# Patient Record
Sex: FEMALE | Race: WHITE | NOT HISPANIC OR LATINO
[De-identification: names, ages, dates, MRNs, and addresses within clinical notes are randomized per-mention and may not be internally consistent; named-entity substitution may affect disease eponyms.]

---

## 2019-01-14 ENCOUNTER — TRANSCRIPTION ENCOUNTER (OUTPATIENT)
Age: 79
End: 2019-01-14

## 2019-01-15 ENCOUNTER — OUTPATIENT (OUTPATIENT)
Dept: OUTPATIENT SERVICES | Facility: HOSPITAL | Age: 79
LOS: 1 days | End: 2019-01-15
Payer: MEDICARE

## 2019-01-15 VITALS
SYSTOLIC BLOOD PRESSURE: 109 MMHG | DIASTOLIC BLOOD PRESSURE: 63 MMHG | HEART RATE: 83 BPM | RESPIRATION RATE: 16 BRPM | OXYGEN SATURATION: 94 %

## 2019-01-15 VITALS
TEMPERATURE: 99 F | OXYGEN SATURATION: 96 % | HEART RATE: 80 BPM | SYSTOLIC BLOOD PRESSURE: 132 MMHG | WEIGHT: 177.69 LBS | RESPIRATION RATE: 20 BRPM | HEIGHT: 63 IN | DIASTOLIC BLOOD PRESSURE: 65 MMHG

## 2019-01-15 DIAGNOSIS — Z98.49 CATARACT EXTRACTION STATUS, UNSPECIFIED EYE: Chronic | ICD-10-CM

## 2019-01-15 DIAGNOSIS — N60.09 SOLITARY CYST OF UNSPECIFIED BREAST: Chronic | ICD-10-CM

## 2019-01-15 DIAGNOSIS — Z90.49 ACQUIRED ABSENCE OF OTHER SPECIFIED PARTS OF DIGESTIVE TRACT: Chronic | ICD-10-CM

## 2019-01-15 DIAGNOSIS — H33.021 RETINAL DETACHMENT WITH MULTIPLE BREAKS, RIGHT EYE: ICD-10-CM

## 2019-01-15 PROCEDURE — C1889: CPT

## 2019-01-15 PROCEDURE — 67108 REPAIR DETACHED RETINA: CPT | Mod: RT

## 2019-01-15 PROCEDURE — 93010 ELECTROCARDIOGRAM REPORT: CPT

## 2019-01-15 PROCEDURE — 93005 ELECTROCARDIOGRAM TRACING: CPT

## 2019-01-15 NOTE — ASU PREOP CHECKLIST - HEIGHT IN INCHES
Anxiety    Headache    History of ITP    HTN (hypertension)    IBS (irritable bowel syndrome)    Labyrinthitis 3

## 2019-01-15 NOTE — ASU PATIENT PROFILE, ADULT - PMH
Chronic sinusitis    GERD (gastroesophageal reflux disease)    HTN (hypertension)    Hyperparathyroidism

## 2019-01-15 NOTE — ASU DISCHARGE PLAN (ADULT/PEDIATRIC). - PT EDUC
Jona gas bubble card, Eye shield instructions and eye kit given to patient/Implant card (specify)/other (specify)

## 2023-01-30 PROBLEM — K21.9 GASTRO-ESOPHAGEAL REFLUX DISEASE WITHOUT ESOPHAGITIS: Chronic | Status: ACTIVE | Noted: 2019-01-15

## 2023-01-30 PROBLEM — E21.3 HYPERPARATHYROIDISM, UNSPECIFIED: Chronic | Status: ACTIVE | Noted: 2019-01-15

## 2023-01-30 PROBLEM — J32.9 CHRONIC SINUSITIS, UNSPECIFIED: Chronic | Status: ACTIVE | Noted: 2019-01-15

## 2023-01-30 PROBLEM — I10 ESSENTIAL (PRIMARY) HYPERTENSION: Chronic | Status: ACTIVE | Noted: 2019-01-15

## 2023-02-20 ENCOUNTER — APPOINTMENT (OUTPATIENT)
Dept: PHYSICAL MEDICINE AND REHAB | Facility: CLINIC | Age: 83
End: 2023-02-20

## 2023-06-08 ENCOUNTER — RX ONLY (RX ONLY)
Age: 83
End: 2023-06-08

## 2023-07-03 ENCOUNTER — OFFICE (OUTPATIENT)
Facility: LOCATION | Age: 83
Setting detail: OPHTHALMOLOGY
End: 2023-07-03
Payer: MEDICARE

## 2023-07-03 DIAGNOSIS — Z13.5: ICD-10-CM

## 2023-07-03 DIAGNOSIS — H35.3131: ICD-10-CM

## 2023-07-03 DIAGNOSIS — H01.004: ICD-10-CM

## 2023-07-03 DIAGNOSIS — H01.001: ICD-10-CM

## 2023-07-03 DIAGNOSIS — H35.373: ICD-10-CM

## 2023-07-03 PROBLEM — H43.813 POSTERIOR VITREOUS DETACHMENT; BOTH EYES: Status: ACTIVE | Noted: 2023-07-03

## 2023-07-03 PROBLEM — H02.834 DERMATOCHALASIS; RIGHT UPPER LID, LEFT UPPER LID: Status: ACTIVE | Noted: 2023-07-03

## 2023-07-03 PROBLEM — H02.831 DERMATOCHALASIS; RIGHT UPPER LID, LEFT UPPER LID: Status: ACTIVE | Noted: 2023-07-03

## 2023-07-03 PROBLEM — D31.32 CHOROIDAL NEVUS, LEFT EYE: Status: ACTIVE | Noted: 2023-07-03

## 2023-07-03 PROCEDURE — 92014 COMPRE OPH EXAM EST PT 1/>: CPT | Performed by: OPHTHALMOLOGY

## 2023-07-03 PROCEDURE — 92250 FUNDUS PHOTOGRAPHY W/I&R: CPT | Performed by: OPHTHALMOLOGY

## 2023-07-03 ASSESSMENT — LID EXAM ASSESSMENTS
OD_BLEPHARITIS: 1+
OD_DERMATOCHALASIS: 1+
OS_DERMATOCHALASIS: 1+
OS_BLEPHARITIS: 1+

## 2023-07-03 ASSESSMENT — CONFRONTATIONAL VISUAL FIELD TEST (CVF)
OS_FINDINGS: FULL
OD_FINDINGS: FULL

## 2023-07-03 ASSESSMENT — TONOMETRY
OD_IOP_MMHG: 14
OS_IOP_MMHG: 14

## 2023-07-13 PROBLEM — Z13.5 ENCOUNTER FOR SCREENING FOR EYE AND EAR DISORDERS: Status: ACTIVE | Noted: 2023-07-03

## 2023-07-13 ASSESSMENT — REFRACTION_CURRENTRX
OD_SPHERE: -3.00
OS_ADD: +2.75
OS_SPHERE: -1.50
OS_CYLINDER: +2.25
OD_AXIS: 170
OD_CYLINDER: +2.25
OS_AXIS: 165
OD_OVR_VA: 20/
OD_ADD: +2.75
OS_OVR_VA: 20/

## 2023-07-13 ASSESSMENT — REFRACTION_AUTOREFRACTION
OD_CYLINDER: +2.50
OD_SPHERE: -3.00
OS_CYLINDER: +2.00
OS_SPHERE: -0.75
OS_AXIS: 172
OD_AXIS: 175

## 2023-07-13 ASSESSMENT — VISUAL ACUITY
OD_BCVA: 20/30
OS_BCVA: 20/25-1

## 2023-07-13 ASSESSMENT — SPHEQUIV_DERIVED
OS_SPHEQUIV: 0.25
OD_SPHEQUIV: -1.75

## 2024-01-03 ENCOUNTER — OFFICE (OUTPATIENT)
Facility: LOCATION | Age: 84
Setting detail: OPHTHALMOLOGY
End: 2024-01-03
Payer: MEDICARE

## 2024-01-03 DIAGNOSIS — H01.004: ICD-10-CM

## 2024-01-03 DIAGNOSIS — H35.373: ICD-10-CM

## 2024-01-03 DIAGNOSIS — H01.001: ICD-10-CM

## 2024-01-03 DIAGNOSIS — H26.492: ICD-10-CM

## 2024-01-03 DIAGNOSIS — H35.3131: ICD-10-CM

## 2024-01-03 PROCEDURE — 92014 COMPRE OPH EXAM EST PT 1/>: CPT | Performed by: OPHTHALMOLOGY

## 2024-01-03 PROCEDURE — 92134 CPTRZ OPH DX IMG PST SGM RTA: CPT | Performed by: OPHTHALMOLOGY

## 2024-01-03 ASSESSMENT — CONFRONTATIONAL VISUAL FIELD TEST (CVF)
OD_FINDINGS: FULL
OS_FINDINGS: FULL

## 2024-01-03 ASSESSMENT — LID EXAM ASSESSMENTS
OD_DERMATOCHALASIS: 1+
OD_BLEPHARITIS: 1+
OS_BLEPHARITIS: 1+
OS_DERMATOCHALASIS: 1+

## 2024-01-04 PROBLEM — Z96.1 PSEUDOPHAKIA ; BOTH EYES: Status: ACTIVE | Noted: 2024-01-03

## 2024-01-04 ASSESSMENT — REFRACTION_CURRENTRX
OS_ADD: +2.75
OS_SPHERE: -1.25
OS_OVR_VA: 20/
OD_AXIS: 169
OD_SPHERE: -3.00
OS_AXIS: 165
OD_OVR_VA: 20/
OD_ADD: +2.75
OD_CYLINDER: +2.00
OS_CYLINDER: +2.00
OD_VPRISM_DIRECTION: PROGS
OS_VPRISM_DIRECTION: PROGS

## 2024-01-04 ASSESSMENT — REFRACTION_AUTOREFRACTION
OS_AXIS: 171
OD_AXIS: 170
OD_CYLINDER: +2.75
OD_SPHERE: -2.75
OS_CYLINDER: +2.75
OS_SPHERE: -0.75

## 2024-01-04 ASSESSMENT — SPHEQUIV_DERIVED
OD_SPHEQUIV: -1.375
OS_SPHEQUIV: 0.625

## 2024-07-03 ENCOUNTER — OFFICE (OUTPATIENT)
Facility: LOCATION | Age: 84
Setting detail: OPHTHALMOLOGY
End: 2024-07-03
Payer: MEDICARE

## 2024-07-03 DIAGNOSIS — H35.373: ICD-10-CM

## 2024-07-03 DIAGNOSIS — H43.813: ICD-10-CM

## 2024-07-03 DIAGNOSIS — H26.492: ICD-10-CM

## 2024-07-03 DIAGNOSIS — H52.4: ICD-10-CM

## 2024-07-03 DIAGNOSIS — H35.3131: ICD-10-CM

## 2024-07-03 PROCEDURE — 92015 DETERMINE REFRACTIVE STATE: CPT | Mod: GA | Performed by: OPHTHALMOLOGY

## 2024-07-03 PROCEDURE — 92250 FUNDUS PHOTOGRAPHY W/I&R: CPT | Performed by: OPHTHALMOLOGY

## 2024-07-03 PROCEDURE — 92014 COMPRE OPH EXAM EST PT 1/>: CPT | Performed by: OPHTHALMOLOGY

## 2024-07-03 ASSESSMENT — LID EXAM ASSESSMENTS
OS_BLEPHARITIS: 1+
OD_DERMATOCHALASIS: 1+
OD_BLEPHARITIS: 1+
OS_DERMATOCHALASIS: 1+

## 2024-07-03 ASSESSMENT — CONFRONTATIONAL VISUAL FIELD TEST (CVF)
OD_FINDINGS: FULL
OS_FINDINGS: FULL

## 2024-07-30 ENCOUNTER — OFFICE (OUTPATIENT)
Facility: LOCATION | Age: 84
Setting detail: OPHTHALMOLOGY
End: 2024-07-30
Payer: MEDICARE

## 2024-07-30 DIAGNOSIS — Z13.5: ICD-10-CM

## 2024-07-30 DIAGNOSIS — H53.19: ICD-10-CM

## 2024-07-30 PROBLEM — H35.363 DRUSEN; BOTH EYES: Status: ACTIVE | Noted: 2024-07-30

## 2024-07-30 PROBLEM — H43.813 VITREOUS DETACHMENT; BOTH EYES: Status: ACTIVE | Noted: 2024-07-30

## 2024-07-30 PROBLEM — H35.033 HYPERTENSIVE RETINOPATHY; BOTH EYES: Status: ACTIVE | Noted: 2024-07-30

## 2024-07-30 PROBLEM — H35.313 AGE RELATED MACULAR DEGENERATION DRY; BOTH EYES EARLY: Status: ACTIVE | Noted: 2024-07-30

## 2024-07-30 PROCEDURE — 92250 FUNDUS PHOTOGRAPHY W/I&R: CPT | Mod: GA | Performed by: OPHTHALMOLOGY

## 2024-07-30 PROCEDURE — 99213 OFFICE O/P EST LOW 20 MIN: CPT | Performed by: OPHTHALMOLOGY

## 2024-07-30 ASSESSMENT — LID EXAM ASSESSMENTS
OS_BLEPHARITIS: 1+
OD_DERMATOCHALASIS: 1+
OD_BLEPHARITIS: 1+
OS_DERMATOCHALASIS: 1+

## 2024-07-30 ASSESSMENT — CONFRONTATIONAL VISUAL FIELD TEST (CVF)
OS_FINDINGS: FULL
OD_FINDINGS: FULL

## 2024-07-31 PROBLEM — D31.32 CHOROIDAL NEVUS ; LEFT EYE: Status: ACTIVE | Noted: 2024-07-30

## 2024-07-31 PROBLEM — D31.30 CHOROIDAL NEVUS ; LEFT EYE: Status: ACTIVE | Noted: 2024-07-30

## 2024-07-31 PROBLEM — D31.31 CHOROIDAL NEVUS ; LEFT EYE: Status: ACTIVE | Noted: 2024-07-30

## 2025-01-15 ENCOUNTER — OFFICE (OUTPATIENT)
Facility: LOCATION | Age: 85
Setting detail: OPHTHALMOLOGY
End: 2025-01-15
Payer: MEDICARE

## 2025-01-15 DIAGNOSIS — H35.3131: ICD-10-CM

## 2025-01-15 DIAGNOSIS — H35.033: ICD-10-CM

## 2025-01-15 DIAGNOSIS — H26.492: ICD-10-CM

## 2025-01-15 DIAGNOSIS — H35.373: ICD-10-CM

## 2025-01-15 DIAGNOSIS — H43.813: ICD-10-CM

## 2025-01-15 DIAGNOSIS — H10.45: ICD-10-CM

## 2025-01-15 PROBLEM — H01.00 BLEPHARITIS; RIGHT UPPER LID, LEFT UPPER LID: Status: ACTIVE | Noted: 2025-01-15

## 2025-01-15 PROCEDURE — 92014 COMPRE OPH EXAM EST PT 1/>: CPT | Performed by: OPHTHALMOLOGY

## 2025-01-15 PROCEDURE — 92134 CPTRZ OPH DX IMG PST SGM RTA: CPT | Performed by: OPHTHALMOLOGY

## 2025-01-15 ASSESSMENT — LID EXAM ASSESSMENTS
OS_DERMATOCHALASIS: 1+
OD_COMMENTS: ROSACEA
OD_BLEPHARITIS: 2+
OD_DERMATOCHALASIS: 1+
OS_COMMENTS: ROSACEA
OS_BLEPHARITIS: 2+

## 2025-01-15 ASSESSMENT — CONFRONTATIONAL VISUAL FIELD TEST (CVF)
OD_FINDINGS: FULL
OS_FINDINGS: FULL

## 2025-01-16 PROBLEM — H35.3131 AGE RELATED MACULAR DEGENERATION DRY; BOTH EYES MILD: Status: ACTIVE | Noted: 2025-01-15

## 2025-01-16 PROBLEM — H02.83 DERMATOCHALSIS ; BOTH EYES: Status: ACTIVE | Noted: 2025-01-15

## 2025-01-16 ASSESSMENT — REFRACTION_CURRENTRX
OS_ADD: +2.75
OD_OVR_VA: 20/
OD_VPRISM_DIRECTION: PROGS
OS_AXIS: 170
OD_ADD: +2.75
OD_CYLINDER: +2.00
OD_SPHERE: -3.00
OS_SPHERE: -1.00
OS_CYLINDER: +2.25
OD_AXIS: 180
OS_VPRISM_DIRECTION: PROGS
OS_OVR_VA: 20/

## 2025-01-16 ASSESSMENT — REFRACTION_AUTOREFRACTION
OD_CYLINDER: +2.25
OS_CYLINDER: +2.75
OS_AXIS: 168
OD_SPHERE: -3.00
OD_AXIS: 173
OS_SPHERE: -1.00

## 2025-01-16 ASSESSMENT — KERATOMETRY
OD_AXISANGLE_DEGREES: 169
OD_K1POWER_DIOPTERS: 41.50
OS_K1POWER_DIOPTERS: 42.00
OD_K2POWER_DIOPTERS: 43.50
OS_K2POWER_DIOPTERS: 42.25
OS_AXISANGLE_DEGREES: 149

## 2025-01-16 ASSESSMENT — REFRACTION_MANIFEST
OD_CYLINDER: +2.25
OD_SPHERE: -3.00
OD_ADD: +2.75
OS_ADD: +2.75
OD_AXIS: 170
OS_VA1: 20/20
OS_SPHERE: -1.00
OD_VA1: 20/25-
OS_CYLINDER: +2.50
OD_VA2: 20/20(J1+)
OS_AXIS: 170
OS_VA2: 20/20(J1+)

## 2025-01-16 ASSESSMENT — VISUAL ACUITY
OD_BCVA: 20/25-1
OS_BCVA: 20/25

## 2025-07-16 ENCOUNTER — OFFICE (OUTPATIENT)
Facility: LOCATION | Age: 85
Setting detail: OPHTHALMOLOGY
End: 2025-07-16
Payer: MEDICARE

## 2025-07-16 DIAGNOSIS — H35.3131: ICD-10-CM

## 2025-07-16 DIAGNOSIS — H01.001: ICD-10-CM

## 2025-07-16 DIAGNOSIS — H10.45: ICD-10-CM

## 2025-07-16 DIAGNOSIS — H26.492: ICD-10-CM

## 2025-07-16 DIAGNOSIS — H35.373: ICD-10-CM

## 2025-07-16 DIAGNOSIS — H01.004: ICD-10-CM

## 2025-07-16 PROBLEM — H02.831 DERMATOCHALSIS; RIGHT UPPER LID, LEFT UPPER LID: Status: ACTIVE | Noted: 2025-07-16

## 2025-07-16 PROBLEM — H02.834 DERMATOCHALSIS; RIGHT UPPER LID, LEFT UPPER LID: Status: ACTIVE | Noted: 2025-07-16

## 2025-07-16 PROCEDURE — 92014 COMPRE OPH EXAM EST PT 1/>: CPT | Performed by: OPHTHALMOLOGY

## 2025-07-16 PROCEDURE — 92250 FUNDUS PHOTOGRAPHY W/I&R: CPT | Performed by: OPHTHALMOLOGY

## 2025-07-16 ASSESSMENT — REFRACTION_MANIFEST
OD_CYLINDER: +2.25
OD_AXIS: 170
OD_SPHERE: -3.00
OS_VA2: 20/20(J1+)
OS_ADD: +2.75
OS_CYLINDER: +2.50
OD_VA1: 20/25-
OS_AXIS: 170
OS_VA1: 20/20
OS_SPHERE: -1.00
OD_ADD: +2.75
OD_VA2: 20/20(J1+)

## 2025-07-16 ASSESSMENT — REFRACTION_CURRENTRX
OD_AXIS: 175
OS_OVR_VA: 20/
OS_ADD: +2.75
OS_AXIS: 170
OD_VPRISM_DIRECTION: PROGS
OD_CYLINDER: +2.25
OD_OVR_VA: 20/
OD_ADD: +2.75
OS_CYLINDER: +2.25
OS_VPRISM_DIRECTION: PROGS
OD_SPHERE: -3.00
OS_SPHERE: -0.75

## 2025-07-16 ASSESSMENT — LID EXAM ASSESSMENTS
OD_BLEPHARITIS: 2+
OD_DERMATOCHALASIS: 1+
OS_BLEPHARITIS: 2+
OD_COMMENTS: ROSACEA
OS_DERMATOCHALASIS: 1+
OS_COMMENTS: ROSACEA

## 2025-07-16 ASSESSMENT — KERATOMETRY
OD_K2POWER_DIOPTERS: 43.50
OS_K2POWER_DIOPTERS: 42.25
OD_K1POWER_DIOPTERS: 41.50
OS_K1POWER_DIOPTERS: 42.00
OS_AXISANGLE_DEGREES: 149
OD_AXISANGLE_DEGREES: 169

## 2025-07-16 ASSESSMENT — REFRACTION_AUTOREFRACTION
OD_AXIS: 167
OS_CYLINDER: +2.00
OS_AXIS: 166
OD_SPHERE: -3.50
OS_SPHERE: -0.75
OD_CYLINDER: +2.50

## 2025-07-16 ASSESSMENT — CONFRONTATIONAL VISUAL FIELD TEST (CVF)
OS_FINDINGS: FULL
OD_FINDINGS: FULL

## 2025-07-16 ASSESSMENT — VISUAL ACUITY
OD_BCVA: 20/20
OS_BCVA: 20/20-2